# Patient Record
Sex: FEMALE | Race: WHITE | NOT HISPANIC OR LATINO | Employment: PART TIME | URBAN - METROPOLITAN AREA
[De-identification: names, ages, dates, MRNs, and addresses within clinical notes are randomized per-mention and may not be internally consistent; named-entity substitution may affect disease eponyms.]

---
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
## 2022-02-10 ENCOUNTER — APPOINTMENT (OUTPATIENT)
Dept: LAB | Facility: HOSPITAL | Age: 22
End: 2022-02-10
Payer: COMMERCIAL

## 2022-02-10 ENCOUNTER — OFFICE VISIT (OUTPATIENT)
Dept: LAB | Facility: HOSPITAL | Age: 22
End: 2022-02-10
Payer: COMMERCIAL

## 2022-02-10 DIAGNOSIS — U09.9 POST-COVID-19 CONDITION: ICD-10-CM

## 2022-02-10 LAB
ATRIAL RATE: 59 BPM
ATRIAL RATE: 62 BPM
BASOPHILS # BLD AUTO: 0.05 THOUSANDS/ΜL (ref 0–0.1)
BASOPHILS NFR BLD AUTO: 1 % (ref 0–1)
CARDIAC TROPONIN I PNL SERPL HS: <2 NG/L (ref 8–18)
CHOLEST SERPL-MCNC: 143 MG/DL
EOSINOPHIL # BLD AUTO: 0.11 THOUSAND/ΜL (ref 0–0.61)
EOSINOPHIL NFR BLD AUTO: 2 % (ref 0–6)
ERYTHROCYTE [DISTWIDTH] IN BLOOD BY AUTOMATED COUNT: 12 % (ref 11.6–15.1)
GLUCOSE P FAST SERPL-MCNC: 95 MG/DL (ref 65–99)
HCT VFR BLD AUTO: 39.3 % (ref 34.8–46.1)
HDLC SERPL-MCNC: 43 MG/DL
HGB BLD-MCNC: 12.9 G/DL (ref 11.5–15.4)
IMM GRANULOCYTES # BLD AUTO: 0.01 THOUSAND/UL (ref 0–0.2)
IMM GRANULOCYTES NFR BLD AUTO: 0 % (ref 0–2)
LDLC SERPL CALC-MCNC: 92 MG/DL (ref 0–100)
LYMPHOCYTES # BLD AUTO: 1.57 THOUSANDS/ΜL (ref 0.6–4.47)
LYMPHOCYTES NFR BLD AUTO: 26 % (ref 14–44)
MCH RBC QN AUTO: 29.9 PG (ref 26.8–34.3)
MCHC RBC AUTO-ENTMCNC: 32.8 G/DL (ref 31.4–37.4)
MCV RBC AUTO: 91 FL (ref 82–98)
MONOCYTES # BLD AUTO: 0.37 THOUSAND/ΜL (ref 0.17–1.22)
MONOCYTES NFR BLD AUTO: 6 % (ref 4–12)
NEUTROPHILS # BLD AUTO: 3.87 THOUSANDS/ΜL (ref 1.85–7.62)
NEUTS SEG NFR BLD AUTO: 65 % (ref 43–75)
NONHDLC SERPL-MCNC: 100 MG/DL
NRBC BLD AUTO-RTO: 0 /100 WBCS
P AXIS: 14 DEGREES
P AXIS: 42 DEGREES
PLATELET # BLD AUTO: 389 THOUSANDS/UL (ref 149–390)
PMV BLD AUTO: 9.8 FL (ref 8.9–12.7)
PR INTERVAL: 154 MS
PR INTERVAL: 154 MS
QRS AXIS: 29 DEGREES
QRS AXIS: 30 DEGREES
QRSD INTERVAL: 82 MS
QRSD INTERVAL: 82 MS
QT INTERVAL: 416 MS
QT INTERVAL: 420 MS
QTC INTERVAL: 415 MS
QTC INTERVAL: 422 MS
RBC # BLD AUTO: 4.31 MILLION/UL (ref 3.81–5.12)
T WAVE AXIS: 20 DEGREES
T WAVE AXIS: 20 DEGREES
TRIGL SERPL-MCNC: 40 MG/DL
TSH SERPL DL<=0.05 MIU/L-ACNC: 1 UIU/ML (ref 0.46–3.98)
VENTRICULAR RATE: 59 BPM
VENTRICULAR RATE: 62 BPM
WBC # BLD AUTO: 5.98 THOUSAND/UL (ref 4.31–10.16)

## 2022-02-10 PROCEDURE — 93010 ELECTROCARDIOGRAM REPORT: CPT | Performed by: INTERNAL MEDICINE

## 2022-02-10 PROCEDURE — 93005 ELECTROCARDIOGRAM TRACING: CPT

## 2022-02-10 PROCEDURE — 82947 ASSAY GLUCOSE BLOOD QUANT: CPT

## 2022-02-10 PROCEDURE — 85025 COMPLETE CBC W/AUTO DIFF WBC: CPT

## 2022-02-10 PROCEDURE — 84484 ASSAY OF TROPONIN QUANT: CPT

## 2022-02-10 PROCEDURE — 84443 ASSAY THYROID STIM HORMONE: CPT

## 2022-02-10 PROCEDURE — 80061 LIPID PANEL: CPT

## 2022-02-10 PROCEDURE — 36415 COLL VENOUS BLD VENIPUNCTURE: CPT

## 2022-02-15 ENCOUNTER — PROCEDURE VISIT (OUTPATIENT)
Dept: OBGYN CLINIC | Facility: CLINIC | Age: 22
End: 2022-02-15
Payer: COMMERCIAL

## 2022-02-15 VITALS
WEIGHT: 160 LBS | BODY MASS INDEX: 25.11 KG/M2 | DIASTOLIC BLOOD PRESSURE: 70 MMHG | SYSTOLIC BLOOD PRESSURE: 122 MMHG | HEIGHT: 67 IN

## 2022-02-15 DIAGNOSIS — Z97.5 CONTRACEPTION, DEVICE INTRAUTERINE: Primary | ICD-10-CM

## 2022-02-15 PROCEDURE — 58300 INSERT INTRAUTERINE DEVICE: CPT

## 2022-02-15 NOTE — PATIENT INSTRUCTIONS
Levonorgestrel (Into the uterus)   Levonorgestrel (dow-[id number]-[id number]-IVAN-trel)  Prevents pregnancy and treats heavy menstrual bleeding  This is an intrauterine device (IUD), which is a reversible form of birth control  This IUD slowly releases levonorgestrel, a hormone  Brand Name(s): Sherle Arm, Mirena, [place]   There may be other brand names for this medicine  When This Medicine Should Not Be Used: This device is not right for everyone  Do not use it if you had an allergic reaction to levonorgestrel, silicone, polyethylene, silica, barium sulfate or iron oxide, or if you are pregnant  How to Use This Medicine:   Device  · A nurse or other trained health professional will give you this medicine  · The IUD is usually inserted by your doctor during your monthly period  You will need to see your doctor 4 to 6 weeks after the IUD is placed and then once a year  · Your IUD has a string or "tail" that is made of plastic thread  About one or two inches of this string hangs into your vagina  You cannot see this string, and it will not cause problems when you have sex  Check your IUD after each monthly period  You may not be protected against pregnancy if you cannot feel the string or if you feel plastic  Do the following to check the placement of your IUD:  ? Wash your hands with soap and warm water  Dry them with a clean towel  ? Bend your knees and squat low to the ground  ? Gently put your index finger high inside your vagina  The cervix is at the top of the vagina  Find the IUD string coming from your cervix  Never pull on the string  You should not be able to feel the plastic of the IUD itself  Wash your hands after you are done checking your IUD string  · Your doctor will need to remove your IUD after 3 years for [facility], after 5 years for [facility], after 6 years for EDVIN, or after 7 years for Mirena®  You will also need to have it replaced if it comes out of your uterus   If you are using Mirena® or Liletta® and want to stop, your doctor can remove it at any time  However, you may become pregnant as soon as Jayne Valentino, Mirena®, or Jami Counter is removed or if you have intercourse the week before Babs Hungerford is removed  Use another form of birth control or have a new IUD inserted to keep from getting pregnant  Drugs and Foods to Avoid:   Ask your doctor or pharmacist before using any other medicine, including over-the-counter medicines, vitamins, and herbal products  · Some medicines can affect how this device works  Tell your doctor if you are using a blood thinner (including warfarin)  Warnings While Using This Medicine:   · Tell your doctor if you have had any problems, infections, or other conditions that affected your reproductive system  There are many problems that could make an IUD a bad choice for you, including if you have fibroids, unexplained bleeding, a uterus that has an unusual shape, a recent infection, a history of pelvic inflammatory disease, an abnormal Pap test, ectopic pregnancy, cancer or suspected cancer, or an existing IUD  · Tell your doctor if you are breastfeeding, or if you had a baby, miscarriage, or  in the past 3 months  Tell your doctor if you have liver disease (including tumor or cancer), heart disease, breast cancer, heart or blood circulation problems, migraine, high blood pressure, or a history of heart valve problems, blood clotting problems, stroke, or heart attack  Tell your doctor if you have problems with your immune system or have had surgery on your female organs (especially fallopian tubes)  · There is a small chance that you could get pregnant when using an IUD, just as there is with any birth control  If you get pregnant, your doctor may remove your IUD to lower the risk of miscarriage or other problems  · This medicine may cause the following problems:  ? Increased risk of ectopic pregnancy (pregnancy outside the uterus)  ?  Increased risk of serious infections, including sepsis  ? Increased risk of pelvic inflammatory disease (PID) or endometritis  ? Perforation (hole in the wall of your uterus), which can damage other organs  ? Increased risk for ovarian cysts  ? Increased risk of cancer of the breast, uterus, or cervix  ? Increased risk of high blood pressure, stroke, heart attack, or clotting problems  ? Jaundice (yellow skin or eyes)  · You might have some spotting and cramping during the first weeks after the IUD has been inserted  These symptoms should decrease or go away within a few weeks up to 6 months  · You could have less bleeding or even stop having periods by the end of the first year  Call your doctor if you have a change from your regular bleeding pattern after you have had your IUD for awhile, including more bleeding or if you miss a period (and you were having periods even with your IUD)  · An IUD can slip partly or all the way out of your uterus  If this happens, use condoms or another form of birth control, and call your doctor right away  · This IUD will not protect you from HIV/AIDS or other sexually transmitted diseases  · If you have the [address] [place] [address] or [facility], tell your doctor before you have an MRI test   · Your doctor will check your progress and the effects of this medicine at regular visits  Keep all appointments    Possible Side Effects While Using This Medicine:   Call your doctor right away if you notice any of these side effects:  · Allergic reaction: Itching or hives, swelling in your face or hands, swelling or tingling in your mouth or throat, chest tightness, trouble breathing  · Bloating, stomach or pelvic pain, spasm, tenderness, or cramping that is sudden or severe  · Chest pain, problems with speech or walking, numbness or weakness in your arm or leg or on one side of your body  · Heavy bleeding from your vagina  · Pain during sex, or if your partner feels the hard plastic of the IUD during sex  · Severe headache, vision changes  · Unusual bleeding, bruising, or weakness  · Vaginal discharge that has a bad smell, fever, chills, sores on your genitals  · Yellow skin or eyes  If you notice these less serious side effects, talk with your doctor:   · Acne, dandruff, oily skin or other skin changes  · Breast pain or discomfort  · Change in bleeding pattern after the first few months  · Dizziness or lightheadedness after IUD is placed  · Mild itching around your vagina and genitals  · Weight gain  If you notice other side effects that you think are caused by this medicine, tell your doctor  Call your doctor for medical advice about side effects  You may report side effects to FDA at 2-520-FDA-9428    © Copyright [employer] 2021 Information is for End User's use only and may not be sold, redistributed or otherwise used for commercial purposes  The above information is an  only  It is not intended as medical advice for individual conditions or treatments  Talk to your doctor, nurse or pharmacist before following any medical regimen to see if it is safe and effective for you

## 2022-02-15 NOTE — PROGRESS NOTES
IUD Insertion Procedure Note    Pre-operative Diagnosis: request for IUD    Post-operative Diagnosis: same    Indications: contraception    Procedure Details   Urine pregnancy test was done today  and result was negative     The risks (including infection, bleeding, pain, and uterine perforation) and benefits of the procedure were explained to the patient and Written informed consent was obtained  Cervix cleansed with Betadine  Uterus sounded to 6 cm  IUD inserted without difficulty  String visible and trimmed  Patient tolerated procedure well  IUD Information:  Mirena  Condition:  Stable    Complications:  None    Plan:    The patient was advised to call for any fever or for prolonged or severe pain or bleeding  She was advised to use OTC ibuprofen as needed for mild to moderate pain  Attending Physician Documentation:  I was present for or participated in the entire procedure, including opening and closing

## 2022-03-01 ENCOUNTER — OFFICE VISIT (OUTPATIENT)
Dept: OBGYN CLINIC | Facility: CLINIC | Age: 22
End: 2022-03-01
Payer: COMMERCIAL

## 2022-03-01 VITALS
DIASTOLIC BLOOD PRESSURE: 80 MMHG | SYSTOLIC BLOOD PRESSURE: 122 MMHG | WEIGHT: 160 LBS | BODY MASS INDEX: 25.11 KG/M2 | HEIGHT: 67 IN

## 2022-03-01 DIAGNOSIS — Z97.5 CONTRACEPTION, DEVICE INTRAUTERINE: Primary | ICD-10-CM

## 2022-03-01 PROCEDURE — 99213 OFFICE O/P EST LOW 20 MIN: CPT | Performed by: OBSTETRICS & GYNECOLOGY

## 2022-03-01 NOTE — PROGRESS NOTES
Assessment/Plan:         Diagnoses and all orders for this visit:    Contraception, device intrauterine          Subjective:      Patient ID: Ovidio Mac is a 25 y o  female  Patient is a 24-year-old nulligravida who requests an IUD for control of irregular menstrual bleeding and for contraceptive who had an IUD placed 2 weeks ago  She is having no problems with it, denies chills, fever, nausea, vomiting or unusual bleeding  Examination today the strings are visible, she does not have any cervical motion tenderness  The remainder pelvic exam is within normal limits without adnexa tenderness or evidence of infection  We will see her back in 1 year or as needed  The following portions of the patient's history were reviewed and updated as appropriate: allergies, current medications, past family history, past medical history, past social history, past surgical history and problem list     Review of Systems   Constitutional: Negative for chills, diaphoresis, fatigue, fever and unexpected weight change  HENT: Negative for congestion, sinus pressure, sinus pain, tinnitus and trouble swallowing  Eyes: Negative for visual disturbance  Respiratory: Negative for cough, chest tightness and shortness of breath  Cardiovascular: Negative for chest pain, palpitations and leg swelling  Gastrointestinal: Negative for abdominal distention, abdominal pain, anal bleeding, constipation, diarrhea, nausea, rectal pain and vomiting  Endocrine: Negative for heat intolerance  Genitourinary: Negative for difficulty urinating, dysuria, flank pain, frequency, genital sores, hematuria and urgency  Musculoskeletal: Negative for arthralgias, back pain and joint swelling  Skin: Negative for rash  Allergic/Immunologic: Negative for environmental allergies and food allergies  Neurological: Negative for headaches  Hematological: Negative for adenopathy  Does not bruise/bleed easily     Psychiatric/Behavioral: Negative for decreased concentration and dysphoric mood  The patient is not nervous/anxious  Objective:      /80 (BP Location: Left arm)   Ht 5' 6 5" (1 689 m)   Wt 72 6 kg (160 lb)   LMP 02/11/2022   BMI 25 44 kg/m²          Physical Exam  Vitals and nursing note reviewed  Exam conducted with a chaperone present  Constitutional:       Appearance: Normal appearance  She is normal weight  HENT:      Head: Normocephalic and atraumatic  Nose: Nose normal    Eyes:      Conjunctiva/sclera: Conjunctivae normal    Pulmonary:      Effort: Pulmonary effort is normal    Abdominal:      General: Abdomen is flat  Palpations: Abdomen is soft  Genitourinary:     General: Normal vulva  Exam position: Lithotomy position  Vagina: Normal       Cervix: No cervical motion tenderness  Uterus: Normal  Not deviated, not enlarged and not tender  Adnexa: Right adnexa normal and left adnexa normal          Musculoskeletal:         General: Normal range of motion  Skin:     General: Skin is warm and dry  Neurological:      General: No focal deficit present  Mental Status: She is alert  Mental status is at baseline  Psychiatric:         Mood and Affect: Mood normal          Behavior: Behavior normal          Thought Content:  Thought content normal          Judgment: Judgment normal

## 2022-03-16 ENCOUNTER — OFFICE VISIT (OUTPATIENT)
Dept: OBGYN CLINIC | Facility: CLINIC | Age: 22
End: 2022-03-16
Payer: COMMERCIAL

## 2022-03-16 VITALS
BODY MASS INDEX: 25.11 KG/M2 | DIASTOLIC BLOOD PRESSURE: 70 MMHG | SYSTOLIC BLOOD PRESSURE: 112 MMHG | HEIGHT: 67 IN | WEIGHT: 160 LBS

## 2022-03-16 DIAGNOSIS — Z30.431 IUD CHECK UP: Primary | ICD-10-CM

## 2022-03-16 PROCEDURE — 99213 OFFICE O/P EST LOW 20 MIN: CPT | Performed by: OBSTETRICS & GYNECOLOGY

## 2022-03-16 NOTE — PROGRESS NOTES
Assessment/Plan:         There are no diagnoses linked to this encounter  Subjective:      Patient ID: Gage Alcala is a 25 y o  female  Patient is a 24-year-old nulligravida who presents 2 weeks following insertion of Mirena IUD  She developed heavy vaginal bleeding and pain and cramping  She thought it was similar to a normal menstrual period but reached into her vagina to feel her strings and could feel a portion of the plastic tail of the IUD  Because she could palpate the plastic she pulled on the strings of the IUD came out  We discussed options risks and benefits  Spontaneous expulsion during the 1st menstrual   Following insertion is uncommon but can happen  She would prefer an IUD rather than any other form of birth control  Will order another IUD for her and try 1 more time  The following portions of the patient's history were reviewed and updated as appropriate: allergies, current medications, past family history, past medical history, past social history, past surgical history and problem list     Review of Systems   Constitutional: Negative for chills, diaphoresis, fatigue, fever and unexpected weight change  HENT: Negative for congestion, sinus pressure, sinus pain, tinnitus and trouble swallowing  Eyes: Negative for visual disturbance  Respiratory: Negative for cough, chest tightness and shortness of breath  Cardiovascular: Negative for chest pain, palpitations and leg swelling  Gastrointestinal: Negative for abdominal distention, abdominal pain, anal bleeding, constipation, diarrhea, nausea, rectal pain and vomiting  Endocrine: Negative for heat intolerance  Genitourinary: Negative for difficulty urinating, dysuria, flank pain, frequency, genital sores, hematuria and urgency  Musculoskeletal: Negative for arthralgias, back pain and joint swelling  Skin: Negative for rash  Allergic/Immunologic: Negative for environmental allergies and food allergies  Neurological: Negative for headaches  Hematological: Negative for adenopathy  Does not bruise/bleed easily  Psychiatric/Behavioral: Negative for decreased concentration and dysphoric mood  The patient is not nervous/anxious  Objective:      /70 (BP Location: Left arm)   Ht 5' 6 5" (1 689 m)   Wt 72 6 kg (160 lb)   LMP 03/04/2022   BMI 25 44 kg/m²          Physical Exam  Vitals and nursing note reviewed  Exam conducted with a chaperone present  Constitutional:       Appearance: Normal appearance  She is normal weight  HENT:      Head: Normocephalic and atraumatic  Nose: Nose normal    Eyes:      Conjunctiva/sclera: Conjunctivae normal    Pulmonary:      Effort: Pulmonary effort is normal    Abdominal:      General: Abdomen is flat  Palpations: Abdomen is soft  Musculoskeletal:         General: Normal range of motion  Skin:     General: Skin is warm and dry  Neurological:      General: No focal deficit present  Mental Status: She is alert  Mental status is at baseline  Psychiatric:         Mood and Affect: Mood normal          Behavior: Behavior normal          Thought Content:  Thought content normal          Judgment: Judgment normal

## 2022-03-29 ENCOUNTER — OFFICE VISIT (OUTPATIENT)
Dept: URGENT CARE | Facility: CLINIC | Age: 22
End: 2022-03-29
Payer: COMMERCIAL

## 2022-03-29 VITALS
HEART RATE: 56 BPM | OXYGEN SATURATION: 99 % | BODY MASS INDEX: 25.11 KG/M2 | TEMPERATURE: 97.5 F | RESPIRATION RATE: 18 BRPM | HEIGHT: 67 IN | WEIGHT: 160 LBS

## 2022-03-29 DIAGNOSIS — M79.602 LEFT ARM PAIN: Primary | ICD-10-CM

## 2022-03-29 PROCEDURE — 99213 OFFICE O/P EST LOW 20 MIN: CPT | Performed by: PHYSICIAN ASSISTANT

## 2022-03-29 RX ORDER — CYCLOBENZAPRINE HCL 10 MG
10 TABLET ORAL
Qty: 10 TABLET | Refills: 0 | Status: SHIPPED | OUTPATIENT
Start: 2022-03-29 | End: 2022-04-08

## 2022-03-29 RX ORDER — PREDNISONE 10 MG/1
40 TABLET ORAL DAILY
Qty: 16 TABLET | Refills: 0 | Status: SHIPPED | OUTPATIENT
Start: 2022-03-29 | End: 2022-04-02

## 2022-03-29 NOTE — PROGRESS NOTES
[facility]        NAME: Buzz Van is a 25 y o  female  : 2000    MRN: [id number]  DATE: 2022  TIME: 12:52 PM    Assessment and Plan   Left arm pain [M79 602]  1  Left arm pain  cyclobenzaprine (FLEXERIL) 10 mg tablet    predniSONE 10 mg tablet         Patient Instructions   There are no Patient Instructions on file for this visit  Follow up with PCP in 3-5 days  Proceed to  ER if symptoms worsen  Chief Complaint     Chief Complaint   Patient presents with    Arm Pain     L arm pain that is worse at night when lying on L side  x 3-4 days  History of Present Illness       The pt is a 68-year-old female presenting today for left arm pain  The pain is worse at night when laying down  The pain has been going on for about 3-4 days  No long car rides, OCP's or smoking hx  No CP or SOB  The pain is a sharp shooting pain up the ribs and down the left arm  Review of Systems   Review of Systems   Constitutional: Negative for activity change, appetite change, chills, fatigue and fever  HENT: Negative for congestion, rhinorrhea, sinus pressure, sinus pain and sore throat  Respiratory: Negative for cough, chest tightness and shortness of breath  Cardiovascular: Negative for chest pain and palpitations  Gastrointestinal: Negative for diarrhea, nausea and vomiting  Musculoskeletal: Positive for arthralgias and myalgias  Skin: Negative for color change, pallor, rash and wound  Neurological: Negative for headaches           Current Medications       Current Outpatient Medications:     SUMAtriptan (IMITREX) 25 mg tablet, as needed  , Disp: , Rfl:     cyclobenzaprine (FLEXERIL) 10 mg tablet, Take 1 tablet (10 mg total) by mouth daily at bedtime for 10 days, Disp: 10 tablet, Rfl: 0    predniSONE 10 mg tablet, Take 5 tabs day 1, 2; take 4 tabs day 3 ,4; take 3 tabs day 5, 6; take 2 tabs day 8, 9; take 1 tab day 10, 11 , Disp: 26 tablet, Rfl: 0    predniSONE 10 mg tablet, Take 4 tablets (40 mg total) by mouth daily for 4 days, Disp: 16 tablet, Rfl: 0    predniSONE 20 mg tablet, Take 20 mg by mouth  1 tablet daily for three days then 1 tablet daily for two days, Disp: , Rfl:     Current Allergies     Allergies as of 03/29/2022    (No Known Allergies)            The following portions of the patient's history were reviewed and updated as appropriate: allergies, current medications, past family history, past medical history, past social history, past surgical history and problem list      Past Medical History:   Diagnosis Date    Asthma     Migraines        History reviewed  No pertinent surgical history  History reviewed  No pertinent family history  Medications have been verified  Objective   Pulse 56   Temp 97 5 °F (36 4 °C) (Temporal)   Resp 18   Ht 5' 6 5" (1 689 m)   Wt 72 6 kg (160 lb)   LMP 03/04/2022   SpO2 99%   BMI 25 44 kg/m²        Physical Exam     Physical Exam  Vitals and nursing note reviewed  Constitutional:       General: She is not in acute distress  Appearance: Normal appearance  She is normal weight  She is not ill-appearing, toxic-appearing or diaphoretic  HENT:      Head: Normocephalic and atraumatic  Cardiovascular:      Rate and Rhythm: Normal rate and regular rhythm  Heart sounds: Normal heart sounds  No murmur heard  No friction rub  No gallop  Pulmonary:      Effort: Pulmonary effort is normal  No respiratory distress  Breath sounds: Normal breath sounds  No stridor  No wheezing, rhonchi or rales  Chest:      Chest wall: No tenderness  Musculoskeletal:         General: Tenderness (left rib) present  No swelling  Normal range of motion  Skin:     General: Skin is warm and dry  Capillary Refill: Capillary refill takes less than 2 seconds  Neurological:      Mental Status: She is alert

## 2022-05-24 ENCOUNTER — OFFICE VISIT (OUTPATIENT)
Dept: OBGYN CLINIC | Facility: CLINIC | Age: 22
End: 2022-05-24
Payer: COMMERCIAL

## 2022-05-24 VITALS
DIASTOLIC BLOOD PRESSURE: 72 MMHG | BODY MASS INDEX: 34.39 KG/M2 | SYSTOLIC BLOOD PRESSURE: 118 MMHG | WEIGHT: 214 LBS | HEIGHT: 66 IN

## 2022-05-24 DIAGNOSIS — N91.2 AMENORRHEA: Primary | ICD-10-CM

## 2022-05-24 DIAGNOSIS — Z97.5 CONTRACEPTION, DEVICE INTRAUTERINE: ICD-10-CM

## 2022-05-24 LAB — SL AMB POCT URINE HCG: NORMAL

## 2022-05-24 PROCEDURE — 99214 OFFICE O/P EST MOD 30 MIN: CPT | Performed by: OBSTETRICS & GYNECOLOGY

## 2022-05-24 PROCEDURE — 81025 URINE PREGNANCY TEST: CPT | Performed by: OBSTETRICS & GYNECOLOGY

## 2022-05-24 NOTE — PROGRESS NOTES
IUD Insertion Procedure Note    Pre-operative Diagnosis: request for IUD    Post-operative Diagnosis: same    Indications: contraception    Procedure Details   Urine pregnancy test was not done  The risks (including infection, bleeding, pain, and uterine perforation) and benefits of the procedure were explained to the patient and Written informed consent was obtained  Cervix cleansed with Betadine  Uterus sounded to 8 cm  IUD inserted without difficulty  String visible and trimmed  Patient tolerated procedure well  IUD Information:  Mirena  Condition:  Stable    Complications:  None    Plan:    The patient was advised to call for any fever or for prolonged or severe pain or bleeding  She was advised to use NSAID and OTC ibuprofen as needed for mild to moderate pain  Attending Physician Documentation:  I was present for or participated in the entire procedure, including opening and closing

## 2022-06-08 ENCOUNTER — OFFICE VISIT (OUTPATIENT)
Dept: OBGYN CLINIC | Facility: CLINIC | Age: 22
End: 2022-06-08
Payer: COMMERCIAL

## 2022-06-08 VITALS
SYSTOLIC BLOOD PRESSURE: 122 MMHG | HEIGHT: 63 IN | WEIGHT: 212 LBS | DIASTOLIC BLOOD PRESSURE: 76 MMHG | BODY MASS INDEX: 37.56 KG/M2

## 2022-06-08 DIAGNOSIS — Z30.431 IUD CHECK UP: Primary | ICD-10-CM

## 2022-06-08 PROCEDURE — 99213 OFFICE O/P EST LOW 20 MIN: CPT | Performed by: OBSTETRICS & GYNECOLOGY

## 2022-06-08 RX ORDER — ACETAMINOPHEN AND CODEINE PHOSPHATE 300; 30 MG/1; MG/1
TABLET ORAL
COMMUNITY
Start: 2022-06-02

## 2022-06-08 RX ORDER — AMOXICILLIN 500 MG/1
CAPSULE ORAL
COMMUNITY
Start: 2022-06-02

## 2022-06-08 NOTE — PROGRESS NOTES
Assessment/Plan:         Diagnoses and all orders for this visit:    IUD check up    Other orders  -     amoxicillin (AMOXIL) 500 mg capsule  -     acetaminophen-codeine (TYLENOL with CODEINE #3) 300-30 MG per tablet;  (Patient not taking: Reported on 6/8/2022)          Subjective:      Patient ID: Kam Ayala is a 25 y o  female  HPI    The following portions of the patient's history were reviewed and updated as appropriate: allergies, current medications, past family history, past medical history, past social history, past surgical history and problem list     Review of Systems   Constitutional: Negative for chills, diaphoresis, fatigue, fever and unexpected weight change  HENT: Negative for congestion, sinus pressure, sinus pain, tinnitus and trouble swallowing  Eyes: Negative for visual disturbance  Respiratory: Negative for cough, chest tightness and shortness of breath  Cardiovascular: Negative for chest pain, palpitations and leg swelling  Gastrointestinal: Negative for abdominal distention, abdominal pain, anal bleeding, constipation, diarrhea, nausea, rectal pain and vomiting  Endocrine: Negative for heat intolerance  Genitourinary: Negative for difficulty urinating, dysuria, flank pain, frequency, genital sores, hematuria and urgency  Musculoskeletal: Negative for arthralgias, back pain and joint swelling  Skin: Negative for rash  Allergic/Immunologic: Negative for environmental allergies and food allergies  Neurological: Negative for headaches  Hematological: Negative for adenopathy  Does not bruise/bleed easily  Psychiatric/Behavioral: Negative for decreased concentration and dysphoric mood  The patient is not nervous/anxious  Objective:      /76 (BP Location: Left arm)   Ht 5' 3" (1 6 m)   Wt 96 2 kg (212 lb)   LMP 05/19/2022 (Approximate)   BMI 37 55 kg/m²          Physical Exam  Vitals and nursing note reviewed  Exam conducted with a chaperone present  Constitutional:       Appearance: Normal appearance  She is normal weight  HENT:      Head: Normocephalic and atraumatic  Nose: Nose normal    Eyes:      Conjunctiva/sclera: Conjunctivae normal    Pulmonary:      Effort: Pulmonary effort is normal    Abdominal:      General: Abdomen is flat  Palpations: Abdomen is soft  Genitourinary:     General: Normal vulva  Exam position: Lithotomy position  Vagina: Normal       Cervix: Normal       Uterus: Normal        Adnexa: Right adnexa normal and left adnexa normal       Comments: Strings visible  Musculoskeletal:         General: Normal range of motion  Skin:     General: Skin is warm and dry  Neurological:      General: No focal deficit present  Mental Status: She is alert  Mental status is at baseline  Psychiatric:         Mood and Affect: Mood normal          Behavior: Behavior normal          Thought Content:  Thought content normal          Judgment: Judgment normal

## 2022-06-17 ENCOUNTER — OFFICE VISIT (OUTPATIENT)
Dept: URGENT CARE | Facility: CLINIC | Age: 22
End: 2022-06-17
Payer: COMMERCIAL

## 2022-06-17 ENCOUNTER — APPOINTMENT (EMERGENCY)
Dept: RADIOLOGY | Facility: HOSPITAL | Age: 22
End: 2022-06-17
Payer: COMMERCIAL

## 2022-06-17 ENCOUNTER — HOSPITAL ENCOUNTER (EMERGENCY)
Facility: HOSPITAL | Age: 22
Discharge: HOME | End: 2022-06-17
Attending: EMERGENCY MEDICINE | Admitting: EMERGENCY MEDICINE
Payer: COMMERCIAL

## 2022-06-17 VITALS
OXYGEN SATURATION: 98 % | DIASTOLIC BLOOD PRESSURE: 77 MMHG | SYSTOLIC BLOOD PRESSURE: 140 MMHG | TEMPERATURE: 98 F | RESPIRATION RATE: 18 BRPM | HEART RATE: 70 BPM

## 2022-06-17 VITALS
SYSTOLIC BLOOD PRESSURE: 128 MMHG | DIASTOLIC BLOOD PRESSURE: 90 MMHG | RESPIRATION RATE: 16 BRPM | TEMPERATURE: 97.4 F | HEART RATE: 58 BPM | OXYGEN SATURATION: 99 %

## 2022-06-17 DIAGNOSIS — R10.32 LLQ ABDOMINAL PAIN: Primary | ICD-10-CM

## 2022-06-17 DIAGNOSIS — R10.32 LEFT LOWER QUADRANT ABDOMINAL PAIN: Primary | ICD-10-CM

## 2022-06-17 DIAGNOSIS — N83.209 OVARIAN CYST: ICD-10-CM

## 2022-06-17 LAB
AMORPH URATE CRY URNS QL MICRO: NORMAL
BACTERIA UR QL AUTO: NORMAL /HPF
BILIRUB UR QL STRIP: NEGATIVE
CLARITY UR: CLEAR
COLOR UR: YELLOW
COLOR, POC: YELLOW
EXT PREG TEST URINE: NEGATIVE
EXT. CONTROL ED NAV: NORMAL
GLUCOSE UR STRIP-MCNC: NEGATIVE MG/DL
HGB UR QL STRIP.AUTO: ABNORMAL
KETONES UR STRIP-MCNC: NEGATIVE MG/DL
LEUKOCYTE ESTERASE UR QL STRIP: NEGATIVE
NITRITE UR QL STRIP: NEGATIVE
NON-SQ EPI CELLS URNS QL MICRO: NORMAL /HPF
PH UR STRIP.AUTO: 7.5 [PH] (ref 4.5–8)
PROT UR STRIP-MCNC: NEGATIVE MG/DL
RBC #/AREA URNS AUTO: NORMAL /HPF
SP GR UR STRIP.AUTO: 1.02 (ref 1–1.03)
UROBILINOGEN UR QL STRIP.AUTO: 0.2 E.U./DL
WBC #/AREA URNS AUTO: NORMAL /HPF

## 2022-06-17 PROCEDURE — 81001 URINALYSIS AUTO W/SCOPE: CPT

## 2022-06-17 PROCEDURE — S9083 URGENT CARE CENTER GLOBAL: HCPCS

## 2022-06-17 PROCEDURE — 74176 CT ABD & PELVIS W/O CONTRAST: CPT

## 2022-06-17 PROCEDURE — G0382 LEV 3 HOSP TYPE B ED VISIT: HCPCS

## 2022-06-17 PROCEDURE — 76856 US EXAM PELVIC COMPLETE: CPT

## 2022-06-17 PROCEDURE — 81025 URINE PREGNANCY TEST: CPT

## 2022-06-17 PROCEDURE — 76830 TRANSVAGINAL US NON-OB: CPT

## 2022-06-17 PROCEDURE — 99284 EMERGENCY DEPT VISIT MOD MDM: CPT

## 2022-06-17 PROCEDURE — G1004 CDSM NDSC: HCPCS

## 2022-06-17 PROCEDURE — 99284 EMERGENCY DEPT VISIT MOD MDM: CPT | Performed by: EMERGENCY MEDICINE

## 2022-06-17 NOTE — DISCHARGE INSTRUCTIONS
Your workup here was not concerning for anything dangerous  Therefore there is no need for you to stay at the hospital for further testing  We feel safe to send you home  You can use Tylenol and Motrin for management of your symptoms  Your imaging findings had the following:   Approximate 6 cm left ovarian unilocular cyst  Follow-up with repeat ultrasound in 8-12 weeks  You should follow up with your OBGYN to assess for resolution of your symptoms and to determine if there is further evaluation that needs to be performed      Return to the emergency department if you have any symptoms of worsening pain or nausea or vomiting

## 2022-06-17 NOTE — ED PROVIDER NOTES
Final Diagnosis:  No diagnosis found  Chief Complaint   Patient presents with    Abdominal Pain     Pt presents ambulatory with c/o LLQ pain  Sent from urgent care        History of Present Illness   This is a 25 y o  female PMH significant for asthma, recent wisdom tooth extraction coming in today with complaint of left lower quadrant abdominal pain  She reports this started 2 days ago  It has been slowly worsening throughout the last 2 days  Reports associated with movement  She does not notice it when lying still  She was evaluated earlier at an urgent care who thought it was related to a recent IUD placement  The patient reports minimal vaginal spotting when she goes to the bathroom, however denies any other bleeding or discharge  She does not feel like she is late on her period  Reports she has not been sexually active  Reports she has been feeling a little nauseous from the amoxicillin, however denies any episodes of vomiting  She has no history kidney stones or abdominal surgeries  No episodes of syncope  Denies other fevers, chills, chest pain, shortness of breath  She does not drink or smoke  She is sexually active, last 1 month ago  No other complaints at this time     - No language barrier    - History obtained from patient and chart   - Reviewed and documented relevant past medical/family/social history  - There are no limitations to the history obtained  - Previous charting was reviewed  Some data reviewed included below for ease of access whether or not it is relevant to this patient encounter  Procedures             Past Medical, Past Surgical History:    has a past medical history of Asthma and Migraines  has a past surgical history that includes Dora tooth extraction       Allergies:   No Known Allergies    Social and Family History:     Social History     Substance and Sexual Activity   Alcohol Use Never     Social History     Tobacco Use   Smoking Status Never Smoker Smokeless Tobacco Never Used     Social History     Substance and Sexual Activity   Drug Use Never       Review of Systems:   Review of Systems   Constitutional: Negative for chills and fever  HENT: Negative for sore throat  Eyes: Negative for visual disturbance  Respiratory: Negative for cough and shortness of breath  Cardiovascular: Negative for chest pain and palpitations  Gastrointestinal: Positive for abdominal pain and nausea  Negative for vomiting  Genitourinary: Negative for dysuria  Musculoskeletal: Negative for back pain  Skin: Negative for rash  Neurological: Negative for syncope  All other systems reviewed and are negative  Physical Examination     Vitals:    06/17/22 1623   BP: 128/90   Pulse: 58   Resp: 16   Temp: (!) 97 4 °F (36 3 °C)   TempSrc: Temporal   SpO2: 99%     Vitals reviewed by me  Physical Exam  Vitals and nursing note reviewed  Constitutional:       General: She is not in acute distress  Appearance: She is well-developed  She is not diaphoretic  HENT:      Head: Normocephalic and atraumatic  Eyes:      Conjunctiva/sclera: Conjunctivae normal    Cardiovascular:      Rate and Rhythm: Normal rate and regular rhythm  Heart sounds: No murmur heard  Pulmonary:      Effort: Pulmonary effort is normal  No respiratory distress  Breath sounds: Normal breath sounds  Abdominal:      Palpations: Abdomen is soft  Tenderness: There is abdominal tenderness in the suprapubic area  There is no right CVA tenderness, left CVA tenderness, guarding or rebound  Musculoskeletal:      Cervical back: Neck supple  Skin:     General: Skin is warm and dry  Neurological:      General: No focal deficit present  Mental Status: She is alert     Psychiatric:         Mood and Affect: Mood normal             Risk Stratification Tools                ED Course as of 06/17/22 1918 Fri Jun 17, 2022   1727 PREGNANCY TEST URINE: negative   1727 Nitrite, UA: Negative   1727 Leukocytes, UA: Negative   1855 US pelvis complete w transvaginal     No orders to display     Orders Placed This Encounter   Procedures    POCT pregnancy, urine    POCT urinalysis dipstick       Labs:   Labs Reviewed - No data to display    Imaging:   No results found  Final Diagnosis:  No diagnosis found  MDM:   Shira Iraheta is a 25 y o  who presents with complaints of abdominal pain    Vital signs are within normal limits, physical exam shows suprapubic pain    Assessment and plan: Differential includes ectopic pregnancy vs IUP vs UTI vs torsion vs other etiology  Will obtain UA and trasnvaginal US  Minimal concern for GI etiology  Will monitor closely  Pt denying GC testing at this time  Pt also denying pain control  Management: UA, UPT, US    ED Course as of 06/17/22 1918 Fri Jun 17, 2022   1727 PREGNANCY TEST URINE: negative   1727 Nitrite, UA: Negative   1727 Leukocytes, UA: Negative   1855 US pelvis complete w transvaginal       Re-assessment: Discussed with pt and participated in shared decision making regarding further imaging as workup negative thus far  Proceeded with CT Abd Pelvis  Also advised on US findings as per above  Re-assessment 2: CT imaging showed cyst consistent with US  Symptoms likely related to this  Advised on close follow up  Pt expressed understanding and agreed to follow up with her OBGYN  Final Dispo   Discharge    Medications - No data to display  ED Disposition     None      Follow-up Information    None       Patient's Medications   Discharge Prescriptions    No medications on file     No discharge procedures on file  Prior to Admission Medications   Prescriptions Last Dose Informant Patient Reported? Taking?    SUMAtriptan (IMITREX) 25 mg tablet   Yes No   Sig: as needed     acetaminophen-codeine (TYLENOL with CODEINE #3) 300-30 MG per tablet   Yes No   Patient not taking: Reported on 6/8/2022   amoxicillin (AMOXIL) 500 mg capsule   Yes No cyclobenzaprine (FLEXERIL) 10 mg tablet   No No   Sig: Take 1 tablet (10 mg total) by mouth daily at bedtime for 10 days   predniSONE 10 mg tablet   No No   Sig: Take 5 tabs day 1, 2; take 4 tabs day 3 ,4; take 3 tabs day 5, 6; take 2 tabs day 8, 9; take 1 tab day 10, 11    predniSONE 20 mg tablet   Yes No   Sig: Take 20 mg by mouth  1 tablet daily for three days then 1 tablet daily for two days      Facility-Administered Medications: None       Code Status: No Order    I discussed all pertinent results and aspects of the care plan with the patient and/or present family members  Answered all questions and addressed all concerns  They expressed agreement and had no further concerns  Portions of the record may have been created with voice recognition software  Occasional wrong word or "sound a like" substitutions may have occurred due to the inherent limitations of voice recognition software  Read the chart carefully and recognize, using context, where substitutions have occurred  The attending physician physically available and evaluated the above patient alongside myself       Alexis Duffy MD  06/18/22 [id number]

## 2022-06-17 NOTE — PROGRESS NOTES
[email or web address]        NAME: Jerman Billy is a 25 y o  female  : 2000    MRN: [id number]  DATE: 2022  TIME: 3:27 PM      Assessment and Plan     LLQ abdominal pain [R10 32]  1  LLQ abdominal pain  Transfer to other facility     Patient agreeable to proceed to the ER for further evaluation given recent IUD insertion- requesting to go to [facility]  Patient Instructions     Proceed to the ER for further evaluation  Chief Complaint     Chief Complaint   Patient presents with    Abdominal Pain     Pt reports lower left sided abdominal pain that started yesterday, currently on antibiotics for wisdom tooth extraction, some nausea at times, pain worse with sitting or trying to use bathroom          History of Present Illness     Patient is a 72-year-old female who presents with LLQ abd pain that started this morning  Reports nausea, no vomiting  Reports she had an IUD placed 2022  States she has had vaginal spotting  States she had an IUD in the past that fell out  Denies vomiting or diarrhea  Denies constipation, last BM this morning  Reports increased pain to LLQ with urination but denies true burning with urination  Denies DM hx  Denies fever or chills    Denies chance of pregnancy  Patient is taking amoxicillin for recent wisdom tooth extraction  States she is no longer taking tylenol w/ codeine  Review of Systems     Review of Systems   Constitutional: Negative for chills and fever  Gastrointestinal: Positive for abdominal pain and nausea  Negative for constipation, diarrhea and vomiting  Genitourinary: Positive for vaginal bleeding  Negative for dysuria  All other systems reviewed and are negative          Current Medications       Current Outpatient Medications:     acetaminophen-codeine (TYLENOL with CODEINE #3) 300-30 MG per tablet, , Disp: , Rfl:     amoxicillin (AMOXIL) 500 mg capsule, , Disp: , Rfl:     cyclobenzaprine (FLEXERIL) 10 mg tablet, Take 1 tablet (10 mg total) by mouth daily at bedtime for 10 days, Disp: 10 tablet, Rfl: 0    predniSONE 10 mg tablet, Take 5 tabs day 1, 2; take 4 tabs day 3 ,4; take 3 tabs day 5, 6; take 2 tabs day 8, 9; take 1 tab day 10, 11 , Disp: 26 tablet, Rfl: 0    predniSONE 20 mg tablet, Take 20 mg by mouth  1 tablet daily for three days then 1 tablet daily for two days, Disp: , Rfl:     SUMAtriptan (IMITREX) 25 mg tablet, as needed  , Disp: , Rfl:     Current Allergies     Allergies as of 06/17/2022    (No Known Allergies)              The following portions of the patient's history were reviewed and updated as appropriate: allergies, current medications, past family history, past medical history, past social history, past surgical history and problem list      Past Medical History:   Diagnosis Date    Asthma     Migraines        Past Surgical History:   Procedure Laterality Date    WISDOM TOOTH EXTRACTION         No family history on file  Medications have been verified  Objective     /77   Pulse 70   Temp 98 °F (36 7 °C)   Resp 18   LMP 05/19/2022 (Approximate)   SpO2 98%   Patient's last menstrual period was 05/19/2022 (approximate)  Physical Exam     Physical Exam  Vitals and nursing note reviewed  Constitutional:       General: She is awake  She is not in acute distress  Appearance: She is well-developed  She is not ill-appearing, toxic-appearing or diaphoretic  Cardiovascular:      Rate and Rhythm: Normal rate  Pulses: Normal pulses  Heart sounds: Normal heart sounds, S1 normal and S2 normal    Pulmonary:      Effort: Pulmonary effort is normal       Breath sounds: Normal breath sounds and air entry  Abdominal:      General: Abdomen is flat  Bowel sounds are normal       Palpations: Abdomen is soft  Tenderness: There is abdominal tenderness in the left lower quadrant  There is no right CVA tenderness or left CVA tenderness  Skin:     General: Skin is warm        Capillary Refill: Capillary refill takes less than 2 seconds  Neurological:      Mental Status: She is alert  Psychiatric:         Mood and Affect: Mood normal          Behavior: Behavior normal          Thought Content:  Thought content normal          Judgment: Judgment normal

## 2022-06-21 DIAGNOSIS — N83.202 OVARIAN CYST, LEFT: Primary | ICD-10-CM

## 2022-09-21 ENCOUNTER — HOSPITAL ENCOUNTER (OUTPATIENT)
Dept: RADIOLOGY | Facility: MEDICAL CENTER | Age: 22
Discharge: HOME | End: 2022-09-21
Payer: COMMERCIAL

## 2022-09-21 DIAGNOSIS — N83.202 OVARIAN CYST, LEFT: ICD-10-CM

## 2022-09-21 PROCEDURE — 76830 TRANSVAGINAL US NON-OB: CPT

## 2022-09-21 PROCEDURE — 76856 US EXAM PELVIC COMPLETE: CPT

## 2022-11-14 ENCOUNTER — OFFICE VISIT (OUTPATIENT)
Dept: URGENT CARE | Facility: MEDICAL CENTER | Age: 22
End: 2022-11-14

## 2022-11-14 VITALS
RESPIRATION RATE: 18 BRPM | BODY MASS INDEX: 39.4 KG/M2 | WEIGHT: 222.4 LBS | HEART RATE: 74 BPM | TEMPERATURE: 98 F | OXYGEN SATURATION: 100 %

## 2022-11-14 DIAGNOSIS — R05.1 ACUTE COUGH: Primary | ICD-10-CM

## 2022-11-14 DIAGNOSIS — R09.82 POSTNASAL DRIP: ICD-10-CM

## 2022-11-14 RX ORDER — DEXTROMETHORPHAN HYDROBROMIDE AND PROMETHAZINE HYDROCHLORIDE 15; 6.25 MG/5ML; MG/5ML
5 SOLUTION ORAL 4 TIMES DAILY PRN
Qty: 120 ML | Refills: 0 | Status: SHIPPED | OUTPATIENT
Start: 2022-11-14

## 2022-11-14 NOTE — PATIENT INSTRUCTIONS
1  Over-the-counter cetirizine 10 mg daily  2  Operate a vaporizer or humidifier in your sleeping area  3  Promethazine DM as prescribed for cough  4  Return here see her primary care provider in 7-10 days for any persistent symptoms  5  Go to the ER immediately for any significantly worsening symptoms

## 2022-11-14 NOTE — PROGRESS NOTES
[facility]        NAME: Seble Call is a 23 y o  female  : 2000    MRN: [id number]  DATE: 2022  TIME: 2:56 PM    Assessment and Plan   Acute cough [R05 1]  1  Acute cough  Promethazine-DM (PHENERGAN-DM) 6 25-15 mg/5 mL oral syrup   2  Postnasal drip  Promethazine-DM (PHENERGAN-DM) 6 25-15 mg/5 mL oral syrup         Patient Instructions     1  Over-the-counter cetirizine 10 mg daily  2  Operate a vaporizer or humidifier in your sleeping area  3  Promethazine DM as prescribed for cough  4  Return here see her primary care provider in 7-10 days for any persistent symptoms  5  Go to the ER immediately for any significantly worsening symptoms  Chief Complaint     Chief Complaint   Patient presents with   • Cough     2 weeks           History of Present Illness       17-year-old female patient with a 2 week history of persistent waxing and waning semi productive cough which is worse at night  Patient denies absolutely any other symptoms:  No fever, chills, body aches, nasal congestion, runny nose  She did a COVID test today which was negative  She has been taking plain Mucinex over-the-counter without relief  Review of Systems   Review of Systems   Constitutional: Negative for chills and fever  HENT: Negative for ear pain and sore throat  Eyes: Negative for pain and visual disturbance  Respiratory: Positive for cough  Negative for shortness of breath  Cardiovascular: Negative for chest pain and palpitations  Gastrointestinal: Negative for abdominal pain and vomiting  Genitourinary: Negative for dysuria and hematuria  Musculoskeletal: Negative for arthralgias and back pain  Skin: Negative for color change and rash  Neurological: Negative for seizures and syncope  All other systems reviewed and are negative          Current Medications       Current Outpatient Medications:   •  Promethazine-DM (PHENERGAN-DM) 6 25-15 mg/5 mL oral syrup, Take 5 mL by mouth 4 (four) times a day as needed for cough, Disp: 120 mL, Rfl: 0  •  acetaminophen-codeine (TYLENOL with CODEINE #3) 300-30 MG per tablet, , Disp: , Rfl:   •  amoxicillin (AMOXIL) 500 mg capsule, , Disp: , Rfl:   •  cyclobenzaprine (FLEXERIL) 10 mg tablet, Take 1 tablet (10 mg total) by mouth daily at bedtime for 10 days, Disp: 10 tablet, Rfl: 0  •  predniSONE 10 mg tablet, Take 5 tabs day 1, 2; take 4 tabs day 3 ,4; take 3 tabs day 5, 6; take 2 tabs day 8, 9; take 1 tab day 10, 11 , Disp: 26 tablet, Rfl: 0  •  predniSONE 20 mg tablet, Take 20 mg by mouth  1 tablet daily for three days then 1 tablet daily for two days, Disp: , Rfl:   •  SUMAtriptan (IMITREX) 25 mg tablet, as needed   (Patient not taking: Reported on 11/14/2022), Disp: , Rfl:     Current Allergies     Allergies as of 11/14/2022   • (No Known Allergies)            The following portions of the patient's history were reviewed and updated as appropriate: allergies, current medications, past family history, past medical history, past social history, past surgical history and problem list      Past Medical History:   Diagnosis Date   • Asthma    • Migraines        Past Surgical History:   Procedure Laterality Date   • WISDOM TOOTH EXTRACTION         History reviewed  No pertinent family history  Medications have been verified  Objective   Pulse 74   Temp 98 °F (36 7 °C) (Temporal)   Resp 18   Wt 101 kg (222 lb 6 4 oz)   SpO2 100%   BMI 39 40 kg/m²        Physical Exam     Physical Exam  Constitutional:       Appearance: Normal appearance  HENT:      Head: Normocephalic  Right Ear: Tympanic membrane normal       Left Ear: Tympanic membrane normal       Nose: Nose normal       Mouth/Throat:      Mouth: Mucous membranes are moist       Pharynx: Oropharynx is clear  No oropharyngeal exudate or posterior oropharyngeal erythema     Eyes:      Conjunctiva/sclera: Conjunctivae normal       Pupils: Pupils are equal, round, and reactive to light  Cardiovascular:      Rate and Rhythm: Normal rate  Pulses: Normal pulses  Pulmonary:      Effort: Pulmonary effort is normal    Abdominal:      Tenderness: There is no abdominal tenderness  Musculoskeletal:         General: Normal range of motion  Cervical back: Normal range of motion  Skin:     General: Skin is warm and dry  Capillary Refill: Capillary refill takes less than 2 seconds  Neurological:      General: No focal deficit present  Mental Status: She is alert and oriented to person, place, and time     Psychiatric:         Mood and Affect: Mood normal          Behavior: Behavior normal

## 2023-06-20 ENCOUNTER — ANNUAL EXAM (OUTPATIENT)
Dept: OBGYN CLINIC | Facility: CLINIC | Age: 23
End: 2023-06-20
Payer: COMMERCIAL

## 2023-06-20 VITALS
SYSTOLIC BLOOD PRESSURE: 120 MMHG | DIASTOLIC BLOOD PRESSURE: 80 MMHG | HEIGHT: 66 IN | BODY MASS INDEX: 35.2 KG/M2 | WEIGHT: 219 LBS

## 2023-06-20 DIAGNOSIS — Z01.419 ENCOUNTER FOR GYNECOLOGICAL EXAMINATION WITHOUT ABNORMAL FINDING: Primary | ICD-10-CM

## 2023-06-20 DIAGNOSIS — Z11.3 SCREENING EXAMINATION FOR STD (SEXUALLY TRANSMITTED DISEASE): ICD-10-CM

## 2023-06-20 PROCEDURE — 87591 N.GONORRHOEAE DNA AMP PROB: CPT | Performed by: OBSTETRICS & GYNECOLOGY

## 2023-06-20 PROCEDURE — 87491 CHLMYD TRACH DNA AMP PROBE: CPT | Performed by: OBSTETRICS & GYNECOLOGY

## 2023-06-20 PROCEDURE — 99395 PREV VISIT EST AGE 18-39: CPT | Performed by: OBSTETRICS & GYNECOLOGY

## 2023-06-20 NOTE — PROGRESS NOTES
Assessment/Plan:         There are no diagnoses linked to this encounter  Subjective:      Patient ID: Tonja Powers is a 23 y o  female  Patient is a 24-year-old nulligravida who has had an IUD for over a year  Approximately 1 year ago she developed pelvic pain and went to the emergency room was found to have a 6 cm simple cyst   Repeat ultrasound in 8 to 12 weeks revealed that the cyst had resolved spontaneously  The IUD was placed properly  She continues to have some intermittent pelvic pain which she is able to track to coincide with ovulation  Her periods are regular but very very light most likely from the [place] [address] in place  We discussed the normal physiologic changes of the menstrual cycle and that she can expect to be aware of ovulation as the IUD does not suppress ovulation and the way that some birth control pills might  She would like to continue with the IUD as the symptoms are tolerable particularly now that she has some insight into the cause of the monthly discomfort  She is graduated with an associates degree and is starting [employer] in the fall  She is planning on studying biology and possibly to pursue a premedical course  Return in 1 year or as needed  The following portions of the patient's history were reviewed and updated as appropriate: allergies, current medications, past family history, past medical history, past social history, past surgical history and problem list     Review of Systems   Constitutional: Negative for chills, diaphoresis, fatigue, fever and unexpected weight change  HENT: Negative for congestion, sinus pressure, sinus pain, tinnitus and trouble swallowing  Eyes: Negative for visual disturbance  Respiratory: Negative for cough, chest tightness and shortness of breath  Cardiovascular: Negative for chest pain, palpitations and leg swelling     Gastrointestinal: Negative for abdominal distention, abdominal pain, anal bleeding, constipation, "diarrhea, nausea, rectal pain and vomiting  Endocrine: Negative for heat intolerance  Genitourinary: Negative for difficulty urinating, dysuria, flank pain, frequency, genital sores, hematuria and urgency  Musculoskeletal: Negative for arthralgias, back pain and joint swelling  Skin: Negative for rash  Allergic/Immunologic: Negative for environmental allergies and food allergies  Neurological: Negative for headaches  Hematological: Negative for adenopathy  Does not bruise/bleed easily  Psychiatric/Behavioral: Negative for decreased concentration and dysphoric mood  The patient is not nervous/anxious  Objective:      /80 (BP Location: Left arm, Patient Position: Sitting, Cuff Size: Large)   Ht 5' 6\" (1 676 m)   Wt 99 3 kg (219 lb)   BMI 35 35 kg/m²          Physical Exam  Vitals and nursing note reviewed  Exam conducted with a chaperone present  Constitutional:       General: She is not in acute distress  Appearance: Normal appearance  She is normal weight  She is not ill-appearing  HENT:      Head: Normocephalic  Nose: Nose normal       Mouth/Throat:      Mouth: Mucous membranes are moist       Pharynx: Oropharynx is clear  Eyes:      Conjunctiva/sclera: Conjunctivae normal       Pupils: Pupils are equal, round, and reactive to light  Cardiovascular:      Rate and Rhythm: Normal rate and regular rhythm  Pulses: Normal pulses  Pulmonary:      Effort: Pulmonary effort is normal       Breath sounds: Normal breath sounds  Chest:   Breasts: Osman Score is 5  Right: Normal  No mass, nipple discharge, skin change or tenderness  Left: Normal  No mass, nipple discharge, skin change or tenderness  Abdominal:      General: Abdomen is flat  Bowel sounds are normal       Palpations: Abdomen is soft  Genitourinary:     General: Normal vulva  Exam position: Lithotomy position        Osman stage (genital): 5       Vagina: Normal       Cervix: " Normal       Uterus: Normal        Adnexa: Right adnexa normal and left adnexa normal       Rectum: Normal       Comments: Strings visible  Musculoskeletal:         General: Normal range of motion  Cervical back: Neck supple  Lymphadenopathy:      Upper Body:      Right upper body: No axillary adenopathy  Left upper body: No axillary adenopathy  Skin:     General: Skin is warm and dry  Neurological:      General: No focal deficit present  Mental Status: She is alert     Psychiatric:         Mood and Affect: Mood normal

## 2023-06-22 LAB
C TRACH DNA SPEC QL NAA+PROBE: NEGATIVE
N GONORRHOEA DNA SPEC QL NAA+PROBE: NEGATIVE

## 2024-06-19 ENCOUNTER — HOSPITAL ENCOUNTER (OUTPATIENT)
Dept: RADIOLOGY | Age: 24
Discharge: HOME | End: 2024-06-19
Payer: COMMERCIAL

## 2024-06-19 ENCOUNTER — HOSPITAL ENCOUNTER (OUTPATIENT)
Dept: NON INVASIVE DIAGNOSTICS | Facility: HOSPITAL | Age: 24
Discharge: HOME | End: 2024-06-19
Payer: COMMERCIAL

## 2024-06-19 DIAGNOSIS — I10 ESSENTIAL (PRIMARY) HYPERTENSION: ICD-10-CM

## 2024-06-19 PROCEDURE — 93975 VASCULAR STUDY: CPT

## 2024-06-19 PROCEDURE — 93975 VASCULAR STUDY: CPT | Performed by: SURGERY

## 2024-06-19 PROCEDURE — 76775 US EXAM ABDO BACK WALL LIM: CPT

## 2024-06-21 ENCOUNTER — HOSPITAL ENCOUNTER (OUTPATIENT)
Dept: NON INVASIVE DIAGNOSTICS | Facility: CLINIC | Age: 24
Discharge: HOME | End: 2024-06-21
Payer: COMMERCIAL

## 2024-06-21 VITALS
SYSTOLIC BLOOD PRESSURE: 120 MMHG | DIASTOLIC BLOOD PRESSURE: 80 MMHG | BODY MASS INDEX: 35.35 KG/M2 | HEART RATE: 74 BPM | HEIGHT: 66 IN

## 2024-06-21 DIAGNOSIS — I10 ESSENTIAL (PRIMARY) HYPERTENSION: ICD-10-CM

## 2024-06-21 LAB
AORTIC ROOT: 2.8 CM
APICAL FOUR CHAMBER EJECTION FRACTION: 57 %
ASCENDING AORTA: 2.4 CM
E WAVE DECELERATION TIME: 238 MS
E/A RATIO: 1.7
FRACTIONAL SHORTENING: 40 (ref 28–44)
INTERVENTRICULAR SEPTUM IN DIASTOLE (PARASTERNAL SHORT AXIS VIEW): 0.9 CM
INTERVENTRICULAR SEPTUM: 0.9 CM (ref 0.6–1.1)
IVC: 12 MM
LAAS-AP2: 17.5 CM2
LAAS-AP4: 14.5 CM2
LEFT ATRIUM SIZE: 4.3 CM
LEFT ATRIUM VOLUME (MOD BIPLANE): 39 ML
LEFT ATRIUM VOLUME INDEX (MOD BIPLANE): 18.8 ML/M2
LEFT INTERNAL DIMENSION IN SYSTOLE: 2.9 CM (ref 2.1–4)
LEFT VENTRICULAR INTERNAL DIMENSION IN DIASTOLE: 4.8 CM (ref 3.5–6)
LEFT VENTRICULAR POSTERIOR WALL IN END DIASTOLE: 1.1 CM
LEFT VENTRICULAR STROKE VOLUME: 76 ML
LVSV (TEICH): 76 ML
MV E'TISSUE VEL-SEP: 15 CM/S
MV PEAK A VEL: 0.37 M/S
MV PEAK E VEL: 63 CM/S
MV STENOSIS PRESSURE HALF TIME: 69 MS
MV VALVE AREA P 1/2 METHOD: 3.2
RA PRESSURE ESTIMATED: 3 MMHG
RIGHT ATRIUM AREA SYSTOLE A4C: 12.7 CM2
RIGHT VENTRICLE ID DIMENSION: 3.5 CM
SL CV LEFT ATRIUM LENGTH A2C: 6 CM
SL CV LV EF: 60
SL CV PED ECHO LEFT VENTRICLE DIASTOLIC VOLUME (MOD BIPLANE) 2D: 108 ML
SL CV PED ECHO LEFT VENTRICLE SYSTOLIC VOLUME (MOD BIPLANE) 2D: 31 ML
TRICUSPID ANNULAR PLANE SYSTOLIC EXCURSION: 2.4 CM

## 2024-06-21 PROCEDURE — 93306 TTE W/DOPPLER COMPLETE: CPT | Performed by: INTERNAL MEDICINE

## 2024-06-21 PROCEDURE — 93306 TTE W/DOPPLER COMPLETE: CPT

## 2024-06-25 ENCOUNTER — ANNUAL EXAM (OUTPATIENT)
Dept: OBGYN CLINIC | Facility: CLINIC | Age: 24
End: 2024-06-25
Payer: COMMERCIAL

## 2024-06-25 VITALS
HEIGHT: 66 IN | BODY MASS INDEX: 35.29 KG/M2 | DIASTOLIC BLOOD PRESSURE: 68 MMHG | WEIGHT: 219.6 LBS | SYSTOLIC BLOOD PRESSURE: 108 MMHG

## 2024-06-25 DIAGNOSIS — Z01.419 ENCOUNTER FOR GYNECOLOGICAL EXAMINATION WITHOUT ABNORMAL FINDING: Primary | ICD-10-CM

## 2024-06-25 DIAGNOSIS — Z12.4 SCREENING FOR MALIGNANT NEOPLASM OF THE CERVIX: ICD-10-CM

## 2024-06-25 PROCEDURE — 99395 PREV VISIT EST AGE 18-39: CPT | Performed by: OBSTETRICS & GYNECOLOGY

## 2024-06-25 PROCEDURE — G0145 SCR C/V CYTO,THINLAYER,RESCR: HCPCS | Performed by: OBSTETRICS & GYNECOLOGY

## 2024-06-25 RX ORDER — AMLODIPINE BESYLATE 2.5 MG/1
2.5 TABLET ORAL DAILY
COMMUNITY
Start: 2024-06-08

## 2024-06-25 RX ORDER — LISINOPRIL 10 MG/1
10 TABLET ORAL DAILY
COMMUNITY

## 2024-06-25 NOTE — PROGRESS NOTES
Ambulatory Visit  Name: Kenzie Persaud      : 2000      MRN: [id number]  Encounter Provider: Yumiko Quiroga MD  Encounter Date: 2024   Encounter department: [facility]    Assessment & Plan   1. Encounter for gynecological examination without abnormal finding      History of Present Illness     Kenzie Persaud is a 23 y.o. female who presents for annual exam.  She has near total amenorrhea on the IUD but does develop pelvic pain mostly in her left lower quadrant which might be cyclical in nature.  Not having any of the pain now and she has had a normal examination.  If the pain recurs we will order an ultrasound of the pelvis.  She does have a history of a cyst on her ovary which did resolve spontaneously.  She has been newly diagnosed with hypertension and is currently working with her internal medicine physician to find an appropriate medication.  He is entering her senior year in college and is interested in eventually studying medicine.  Will see her back in 1 year or as needed.    Review of Systems   Constitutional:  Negative for chills, diaphoresis, fatigue, fever and unexpected weight change.   HENT:  Negative for congestion, sinus pressure, sinus pain, tinnitus and trouble swallowing.    Eyes:  Negative for visual disturbance.   Respiratory:  Negative for cough, chest tightness and shortness of breath.    Cardiovascular:  Negative for chest pain, palpitations and leg swelling.   Gastrointestinal:  Negative for abdominal distention, abdominal pain, anal bleeding, constipation, diarrhea, nausea, rectal pain and vomiting.   Endocrine: Negative for heat intolerance.   Genitourinary:  Negative for difficulty urinating, dysuria, flank pain, frequency, genital sores, hematuria and urgency.   Musculoskeletal:  Negative for arthralgias, back pain and joint swelling.   Skin:  Negative for rash.   Allergic/Immunologic: Negative for environmental allergies and food allergies.   Neurological:   "Negative for headaches.   Hematological:  Negative for adenopathy. Does not bruise/bleed easily.   Psychiatric/Behavioral:  Negative for decreased concentration and dysphoric mood. The patient is not nervous/anxious.        Objective     /68 (BP Location: Left arm, Patient Position: Sitting, Cuff Size: Adult)   Ht 5' 6\" (1.676 m)   Wt 99.6 kg (219 lb 9.6 oz)   BMI 35.44 kg/m²     Physical Exam  Vitals reviewed. Exam conducted with a chaperone present.   Constitutional:       Appearance: Normal appearance.   HENT:      Mouth/Throat:      Mouth: Mucous membranes are moist.      Pharynx: Oropharynx is clear.   Eyes:      Conjunctiva/sclera: Conjunctivae normal.      Pupils: Pupils are equal, round, and reactive to light.   Cardiovascular:      Rate and Rhythm: Normal rate and regular rhythm.      Pulses: Normal pulses.      Heart sounds: Normal heart sounds.   Pulmonary:      Effort: Pulmonary effort is normal.      Breath sounds: Normal breath sounds.   Abdominal:      General: Bowel sounds are normal.      Palpations: Abdomen is soft.   Genitourinary:     General: Normal vulva.      Exam position: Lithotomy position.      Osman stage (genital): 5.      Vagina: Normal.      Cervix: Normal.      Uterus: Normal.       Adnexa: Right adnexa normal and left adnexa normal.      Rectum: Normal.   Musculoskeletal:         General: Normal range of motion.      Cervical back: Neck supple.   Skin:     General: Skin is warm and dry.   Neurological:      General: No focal deficit present.      Mental Status: She is alert and oriented to person, place, and time.   Psychiatric:         Mood and Affect: Mood normal.       Administrative Statements           "

## 2024-06-28 LAB
LAB AP GYN PRIMARY INTERPRETATION: NORMAL
Lab: NORMAL

## 2024-08-14 ENCOUNTER — APPOINTMENT (OUTPATIENT)
Dept: LAB | Facility: HOSPITAL | Age: 24
End: 2024-08-14

## 2024-08-14 DIAGNOSIS — Z11.1 TUBERCULOSIS SCREENING: ICD-10-CM

## 2024-08-14 PROCEDURE — 36415 COLL VENOUS BLD VENIPUNCTURE: CPT

## 2024-08-14 PROCEDURE — 86480 TB TEST CELL IMMUN MEASURE: CPT

## 2024-08-15 LAB
GAMMA INTERFERON BACKGROUND BLD IA-ACNC: 0.01 IU/ML
M TB IFN-G BLD-IMP: NEGATIVE
M TB IFN-G CD4+ BCKGRND COR BLD-ACNC: 0.02 IU/ML
M TB IFN-G CD4+ BCKGRND COR BLD-ACNC: 0.03 IU/ML
MITOGEN IGNF BCKGRD COR BLD-ACNC: 9.99 IU/ML

## 2025-05-01 ENCOUNTER — TELEPHONE (OUTPATIENT)
Age: 25
End: 2025-05-01

## 2025-05-01 NOTE — TELEPHONE ENCOUNTER
Pt confirmed first time neuro appt request for June 2nd at 8:30 am in [place] with Mago Olivia. Pt confirmed neck pain as primary dx that causes occasional headaches.

## 2025-05-05 NOTE — TELEPHONE ENCOUNTER
Patient called to reschedule 6/2 at 830am NP appt with [facility] because she can not make it. Accepted 6/11 at 1pm. Address was provided.

## 2025-05-28 ENCOUNTER — TELEPHONE (OUTPATIENT)
Age: 25
End: 2025-05-28

## 2025-05-28 NOTE — TELEPHONE ENCOUNTER
----- Message from JAHAIRA Akbar sent at 5/27/2025  8:47 PM EDT -----  Regarding: NP in June  Can we take a look at this and find out why I am seeing her. She was scheduled with [facility] but pt had to reschedule.  Now she is coming to [place] with me.I am concerned as it is labeled with neck pain and if there is consideration for cervical dystonia and botox to the neck, then Mago would be that person.  There is no information on the chart for this review that I can find. So if you can check into it and see what the pt is in need or or looking for.[place]

## 2025-05-28 NOTE — TELEPHONE ENCOUNTER
Pt returning Arabella's call. Per Teams communication w/Arabella, she is w/patients and unable to take the call at this time.     [phone] - cb# pt will be available to answer at any time for the rest of the day      Arabella bui

## 2025-05-28 NOTE — TELEPHONE ENCOUNTER
I left a detailed message asking that the Patient call me to discuss further details on the pts upcoming appointment.

## 2025-05-29 NOTE — TELEPHONE ENCOUNTER
Inbound call received from Patient returning call and Patient was warm transferred to Arabella Lyn MA for further assistance.

## 2025-05-29 NOTE — TELEPHONE ENCOUNTER
I did speak to Patient.   She stated that her main concern is left sided neck pain for several years. She believes the HA and Migraines are associated with that.   She did do PHYSICAL THERAPY briefly 5 years ago and did see a chiropractor 5 + years ago with no relief.  Pts Mom had asked he PM  who suggested Patient see neuro.  I did inform Patient that I would discuss the case with you and to see if Patient should see an attending or tp continue with the scheduled appointment.

## 2025-06-11 ENCOUNTER — TELEPHONE (OUTPATIENT)
Age: 25
End: 2025-06-11

## 2025-06-11 ENCOUNTER — OFFICE VISIT (OUTPATIENT)
Age: 25
End: 2025-06-11
Payer: COMMERCIAL

## 2025-06-11 VITALS
SYSTOLIC BLOOD PRESSURE: 102 MMHG | WEIGHT: 235 LBS | HEART RATE: 82 BPM | BODY MASS INDEX: 37.77 KG/M2 | HEIGHT: 66 IN | DIASTOLIC BLOOD PRESSURE: 80 MMHG

## 2025-06-11 DIAGNOSIS — M54.2 CERVICALGIA: Primary | ICD-10-CM

## 2025-06-11 DIAGNOSIS — G43.109 MIGRAINE WITH AURA AND WITHOUT STATUS MIGRAINOSUS, NOT INTRACTABLE: ICD-10-CM

## 2025-06-11 DIAGNOSIS — M54.2 CERVICALGIA: ICD-10-CM

## 2025-06-11 DIAGNOSIS — G44.86 CERVICOGENIC HEADACHE: ICD-10-CM

## 2025-06-11 PROCEDURE — 99204 OFFICE O/P NEW MOD 45 MIN: CPT | Performed by: NURSE PRACTITIONER

## 2025-06-11 RX ORDER — BUTALBITAL, ACETAMINOPHEN AND CAFFEINE 50; 325; 40 MG/1; MG/1; MG/1
1 TABLET ORAL ONCE AS NEEDED
Qty: 12 TABLET | Refills: 0 | Status: SHIPPED | OUTPATIENT
Start: 2025-06-11

## 2025-06-11 RX ORDER — TIZANIDINE 2 MG/1
2 TABLET ORAL
Qty: 30 TABLET | Refills: 4 | Status: SHIPPED | OUTPATIENT
Start: 2025-06-11

## 2025-06-11 RX ORDER — BUTALBITAL, ACETAMINOPHEN AND CAFFEINE 50; 325; 40 MG/1; MG/1; MG/1
1 TABLET ORAL ONCE AS NEEDED
Qty: 12 TABLET | Refills: 0 | Status: SHIPPED | OUTPATIENT
Start: 2025-06-11 | End: 2025-06-11 | Stop reason: SDUPTHER

## 2025-06-11 RX ORDER — ESCITALOPRAM OXALATE 5 MG/1
5 TABLET ORAL DAILY
COMMUNITY

## 2025-06-11 NOTE — TELEPHONE ENCOUNTER
[facility] pharmacist call to clarification directions for Butalbital-acetaminophen-caffeine (Esgic) -40 mg per tablet.      [place],  Pharmacist ask to please provide the maximum daily dose.    Thank you!

## 2025-06-11 NOTE — ASSESSMENT & PLAN NOTE
Orders:    XR spine cervical complete 6+ vw flex/ext/obl; Future    Ambulatory Referral to Physical Therapy; Future    tiZANidine (ZANAFLEX) 2 mg tablet; Take 1 tablet (2 mg total) by mouth daily at bedtime as needed for muscle spasms    butalbital-acetaminophen-caffeine (Esgic) -40 mg per tablet; Take 1 tablet by mouth once as needed for headaches

## 2025-06-11 NOTE — PROGRESS NOTES
Name: Kenzie Persaud      : 2000      MRN: [id number]  Encounter Provider: JAHAIRA Sesay  Encounter Date: 2025   [facility]  :  Assessment & Plan  Cervicalgia    Orders:    XR spine cervical complete 6+ vw flex/ext/obl; Future    Ambulatory Referral to Physical Therapy; Future    tiZANidine (ZANAFLEX) 2 mg tablet; Take 1 tablet (2 mg total) by mouth daily at bedtime as needed for muscle spasms    butalbital-acetaminophen-caffeine (Esgic) -40 mg per tablet; Take 1 tablet by mouth once as needed for headaches    Cervicogenic headache    Orders:    XR spine cervical complete 6+ vw flex/ext/obl; Future    Ambulatory Referral to Physical Therapy; Future    tiZANidine (ZANAFLEX) 2 mg tablet; Take 1 tablet (2 mg total) by mouth daily at bedtime as needed for muscle spasms    butalbital-acetaminophen-caffeine (Esgic) -40 mg per tablet; Take 1 tablet by mouth once as needed for headaches    Migraine with aura and without status migrainosus, not intractable           Patient Instructions   Start on Tizanidine 2mg daily at bedtime as needed for neck tightness and tension/headache  Can use Fioricet for headache if not responding to NSAIDs  Will get Cervical Xray series for torticollis, neck pain and structural abnormality  Will restart in PT for modalities re: neck pain and neck related headaches  Can trial magnesium 400mg daily at bedtime  Can trial Riboflavin 400mg daily  Continue to hydrate well   Follow up with Neurology office in 4 months or sooner if needed.      History of Present Illness   Kenzie Persaud is a 23yo female with PMH of asthma and migraines who is being referred to the outpatient neurology office for back pain, sciatica and neck pain.  Patient initially scheduled with migraine subspecialty in [place] however had to reschedule due to being unable to make the appointment.  She was then rescheduled in  [place] for today.  Patient indicated she is primarily concerned regarding left-sided neck pain for several years.  She believes headaches and migraines are associated with that.  Patient did physical therapy briefly 5 years ago and did see a chiropractor 5+ years ago with no relief.  Patient's mother had asked pain management provider about who should be seeing the patient, they recommended neurology.  Reviewed of diagnostics, patient apparently had x-ray in 2013 however no report or films were available for review.    Patient reports to the neurology office today, she does have long-term left neck pain for many years.  She had previously gone to physical therapy for a couple of weeks was given home exercises to do.  She did not feel as though she had any benefits from these exercises at that time.  She also had chiropractic care during that time and noted at times she would feel better but then at times she would also feel worse.  Patient was told it is likely related to torticollis and she does try to change the way she sleeps.  She sleeps on the left side with a pillow that supports her head and neck so she is extended.  Patient states that she rarely sleeps to the right side.  Patient denied having any neck or head trauma as a child prior to the onset of her symptoms.  She has not had any injuries causing malalignment or pain.  She does not recall any diagnostics previously done and has never been given muscle relaxants, trigger point injections or steroids.  Patient denies having any significant weakness in the upper extremities, she denies any radicular symptoms down into the left arm including pain and or paresthesia.  She has full range of motion with some tension and tightness when she has extension and flexion to the right, she can feel the tongue and is in the neck trap and shoulder.  Patient reports that when she has neck pain, it will then develop into a headache.  She states that headaches are  generally starting from the left neck, go up the left side of the head to the frontal area into her forehead.  She states the pain is like a tension/bandlike pain that lasts for hours or until she ices and goes to sleep.  She gets these approximately 2 times per week and is usually towards the end of the day, especially if she has neck pain through the day.  She will take Advil or Tylenol alternating back-and-forth and notes the Advil does tend to work a little bit better but does not fully take the headache and/or pain away.  She has no associated symptoms with these headaches.  Patient cannot really isolate any sort of trigger to these headaches however notes that she does have some increased stressors but notes since being out of school she has had less stress and feels as though her headache activity and neck pain have increased.  Patient does endorse having a history of migraine, they are usually around her menstrual cycle.  She will get 1 day of migraine headache at the onset of her menses.  She feels these are manageable and are not of concern to her, she is more concerned with regards to the neck pain and the cervicogenic headaches related to them.  Patient states she has an IUD but is usually pretty regular with menstrual symptoms therefore should be able to premedicate 1 to 2 days prior to the onset of her menstrual cycle to help reduce her incidence of migraine headache.  On neurological exam, patient has no focal deficits.  On inspection patient does have mild tilt to the left of her head and neck, has some tension to right sided flexion and extension.  Movements and range of motion testing did not elicit any radicular type symptoms including pain and/or paresthesia.  We talked about treatment options, she has not been to physical therapy for approximately 5 years.  Question if additional modalities may be of benefit through neurophysiological therapy.  Possibly dry needling versus e-stim versus other  alternatives and recommended patient return to physical therapy center that may have neuro therapist available for her to work with.  Patient will look into the PT center in [facility].    Patient has not had any diagnostic studies, if structural etiology, will get cervical spine x-rays for now.  If she continues to have pain after physical therapy and conservative measures, will obtain MRI of the cervical spine.  Will give tizanidine 2 mg daily at bedtime, she is to update the neurology office for any side effects or if she does not feel as though it is effective enough to help reduce the incidence of neck tightness and pain.  Patient aware this can cause grogginess, encouraged to take this at bedtime.  Patient may benefit from afternoon dosing if she is able to tolerate it.  She will update the neurology office if needed.  Patient can trial magnesium and riboflavin for headache activity, muscle tightness tension and spasming.  Will trial Fioricet if NSAIDs are ineffective for her headache activity and neck pain.  This may be of benefit for abortive option.  Patient is aware that Fioricet can work for both migraine and headache.    Patient to follow-up in the outpatient neurology office in 4 months or sooner if needed.         Review of Systems   Constitutional:  Negative for chills and fever.   HENT:  Negative for ear pain and sore throat.    Eyes:  Negative for pain and visual disturbance.   Respiratory:  Negative for cough and shortness of breath.    Cardiovascular:  Negative for chest pain and palpitations.   Gastrointestinal:  Negative for abdominal pain and vomiting.   Genitourinary:  Negative for dysuria and hematuria.   Musculoskeletal:  Positive for neck pain and neck stiffness. Negative for arthralgias and back pain.   Skin:  Negative for color change and rash.   Neurological:  Positive for headaches. Negative for seizures and syncope.   All other systems reviewed and are negative.   I have  "personally reviewed the MA's review of systems and made changes as necessary.        Past Medical History[1]    Past Surgical History[2]    Social History[3]    Family History[4]    Current Medications[5]    No Known Allergies     Blood pressure 102/80, pulse 82, height 5' 6\" (1.676 m), weight 107 kg (235 lb).        Objective   /80 (BP Location: Left arm, Patient Position: Sitting, Cuff Size: Large)   Pulse 82   Ht 5' 6\" (1.676 m)   Wt 107 kg (235 lb)   BMI 37.93 kg/m²     Physical Exam  Vitals reviewed.   Constitutional:       Appearance: Normal appearance. She is well-developed.   HENT:      Head: Normocephalic.      Nose: Nose normal.      Mouth/Throat:      Mouth: Mucous membranes are moist.     Eyes:      General: Lids are normal.      Extraocular Movements: Extraocular movements intact.      Pupils: Pupils are equal, round, and reactive to light.     Neck:      Comments: + stretching tension to Rtward flexion and extensionPulmonary:      Effort: Pulmonary effort is normal.   Abdominal:      Palpations: Abdomen is soft.     Musculoskeletal:      Cervical back: Normal range of motion.     Skin:     General: Skin is warm and dry.     Neurological:      Mental Status: She is alert.      Motor: Motor strength is normal.     Coordination: Romberg sign negative.      Deep Tendon Reflexes: Reflexes are normal and symmetric.     Psychiatric:         Attention and Perception: Attention and perception normal.         Mood and Affect: Mood and affect normal.         Speech: Speech normal.         Behavior: Behavior normal. Behavior is cooperative.         Thought Content: Thought content normal.         Cognition and Memory: Cognition and memory normal.         Judgment: Judgment normal.       Neurological Exam  Mental Status  Alert. Oriented to person, place, time and situation. Memory is normal. Recent and remote memory are intact. Speech is normal. Language is fluent with no aphasia. Attention and " concentration are normal. Fund of knowledge is appropriate for level of education.    Cranial Nerves  CN II: Visual acuity is normal. Visual fields full to confrontation.  CN III, IV, VI: Extraocular movements intact bilaterally. Normal lids and orbits bilaterally. Pupils equal round and reactive to light bilaterally.  CN V: Facial sensation is normal.  CN VII: Full and symmetric facial movement.  CN VIII: Hearing is normal.  CN IX, X: Palate elevates symmetrically. Normal gag reflex.  CN XI: Shoulder shrug strength is normal.  CN XII: Tongue midline without atrophy or fasciculations.    Motor  Normal muscle bulk throughout. Normal muscle tone. No abnormal involuntary movements. Strength is 5/5 throughout all four extremities.    Sensory  Light touch is normal in upper and lower extremities. Temperature is normal in upper and lower extremities. Vibration is normal in upper and lower extremities. Proprioception is normal in upper and lower extremities.     Reflexes  Deep tendon reflexes are 2+ and symmetric in all four extremities.    Right pathological reflexes: Patricia's absent.  Left pathological reflexes: Patricia's absent.    Coordination  Right: Finger-to-nose normal. Rapid alternating movement normal. Heel-to-shin normal.Left: Finger-to-nose normal. Rapid alternating movement normal. Heel-to-shin normal.    Gait  Casual gait is normal including stance, stride, and arm swing.Normal toe walking. Normal heel walking. Normal tandem gait. Romberg is absent. Able to rise from chair without using arms.      Radiology Results Review : No pertinent imaging studies reviewed.    Administrative Statements   I have spent a total time of 40 minutes in caring for this patient on the day of the visit/encounter including Risks and benefits of tx options, Instructions for management, Patient and family education, Counseling / Coordination of care, Documenting in the medical record, Reviewing/placing orders in the medical  record (including tests, medications, and/or procedures), and Obtaining or reviewing history  .         [1]   Past Medical History:  Diagnosis Date    Asthma     Migraines    [2]   Past Surgical History:  Procedure Laterality Date    WISDOM TOOTH EXTRACTION     [3]   Social History  Socioeconomic History    Marital status: Single   Tobacco Use    Smoking status: Never    Smokeless tobacco: Never   Vaping Use    Vaping status: Never Used   Substance and Sexual Activity    Alcohol use: Yes     Comment: socially    Drug use: Never    Sexual activity: Yes     Partners: Male     Birth control/protection: Condom Male, I.U.D.   [4]   Family History  Problem Relation Name Age of Onset    Hypertension Mother Gerson Persaud     Thyroid disease Mother Gerson Persaud     Hypertension Father Terry Persaud     Hypertension Maternal Grandfather Jacek Montgomery     Hypertension Maternal Grandmother Aurea Melchor     Thyroid disease Maternal Grandmother Aurea Melchor    [5]   Current Outpatient Medications:     amLODIPine (NORVASC) 2.5 mg tablet, Take 2.5 mg by mouth in the morning. (Patient taking differently: Take 5 mg by mouth in the morning.), Disp: , Rfl:     butalbital-acetaminophen-caffeine (Esgic) -40 mg per tablet, Take 1 tablet by mouth once as needed for headaches, Disp: 12 tablet, Rfl: 0    escitalopram (LEXAPRO) 5 mg tablet, Take 5 mg by mouth daily, Disp: , Rfl:     lisinopril (ZESTRIL) 10 mg tablet, Take 10 mg by mouth in the morning. (Patient taking differently: Take 5 mg by mouth in the morning.), Disp: , Rfl:     tiZANidine (ZANAFLEX) 2 mg tablet, Take 1 tablet (2 mg total) by mouth daily at bedtime as needed for muscle spasms, Disp: 30 tablet, Rfl: 4    SUMAtriptan (IMITREX) 25 mg tablet, as needed   (Patient not taking: Reported on 6/11/2025), Disp: , Rfl:

## 2025-06-11 NOTE — PATIENT INSTRUCTIONS
Start on Tizanidine 2mg daily at bedtime as needed for neck tightness and tension/headache  Can use Fioricet for headache if not responding to NSAIDs  Will get Cervical Xray series for torticollis, neck pain and structural abnormality  Will restart in PT for modalities re: neck pain and neck related headaches  Can trial magnesium 400mg daily at bedtime  Can trial Riboflavin 400mg daily  Continue to hydrate well   Follow up with Neurology office in 4 months or sooner if needed.

## 2025-07-01 ENCOUNTER — ANNUAL EXAM (OUTPATIENT)
Dept: OBGYN CLINIC | Facility: CLINIC | Age: 25
End: 2025-07-01
Payer: COMMERCIAL

## 2025-07-01 VITALS
DIASTOLIC BLOOD PRESSURE: 88 MMHG | WEIGHT: 237 LBS | HEIGHT: 66 IN | SYSTOLIC BLOOD PRESSURE: 128 MMHG | BODY MASS INDEX: 38.09 KG/M2

## 2025-07-01 DIAGNOSIS — Z01.419 ENCOUNTER FOR GYNECOLOGICAL EXAMINATION WITHOUT ABNORMAL FINDING: Primary | ICD-10-CM

## 2025-07-01 PROCEDURE — G0145 SCR C/V CYTO,THINLAYER,RESCR: HCPCS | Performed by: OBSTETRICS & GYNECOLOGY

## 2025-07-01 PROCEDURE — S0612 ANNUAL GYNECOLOGICAL EXAMINA: HCPCS | Performed by: OBSTETRICS & GYNECOLOGY

## 2025-07-01 RX ORDER — LOSARTAN POTASSIUM 25 MG/1
25 TABLET ORAL DAILY
COMMUNITY
Start: 2025-06-26

## 2025-07-01 NOTE — PROGRESS NOTES
Name: Kenzie Persaud      : 2000      MRN: [id number]  Encounter Provider: Yumiko Quiroga MD  Encounter Date: 2025   Encounter department: [facility] [place] [facility]  :  Assessment & Plan  Encounter for gynecological examination without abnormal finding             History of Present Illness   HPI  Kenzie Persaud is a 24 y.o. female who presents Kenzie Persaud is a 23 y.o. female who presents for annual exam.  She has near total amenorrhea on the IUD but had developed pelvic pain mostly in her left lower quadrant which might be cyclical in nature.  It has not changed over the course of the last year.  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  Will see her back in 1 year or as needed.      The following portions of the patient's history were reviewed and updated as appropriate: allergies, current medications, past family history, past medical history, past social history, past surgical history and problem list.         Review of Systems   Constitutional:  Negative for chills, diaphoresis, fatigue, fever and unexpected weight change.   HENT:  Negative for congestion, sinus pressure, sinus pain, tinnitus and trouble swallowing.    Eyes:  Negative for visual disturbance.   Respiratory:  Negative for cough, chest tightness and shortness of breath.    Cardiovascular:  Negative for chest pain, palpitations and leg swelling.   Gastrointestinal:  Negative for abdominal distention, abdominal pain, anal bleeding, constipation, diarrhea, nausea, rectal pain and vomiting.   Endocrine: Negative for heat intolerance.   Genitourinary:  Negative for difficulty urinating, dysuria, flank pain, frequency, genital sores, hematuria and urgency.   Musculoskeletal:  Negative for arthralgias, back pain and joint swelling.   Skin:  Negative for rash.  "  Allergic/Immunologic: Negative for environmental allergies and food allergies.   Neurological:  Negative for headaches.   Hematological:  Negative for adenopathy. Does not bruise/bleed easily.   Psychiatric/Behavioral:  Negative for decreased concentration and dysphoric mood. The patient is not nervous/anxious.           Objective   /88 (BP Location: Left arm, Patient Position: Sitting, Cuff Size: Adult)   Ht 5' 6\" (1.676 m)   Wt 108 kg (237 lb)   LMP  (LMP Unknown)   BMI 38.25 kg/m²      Physical Exam  Vitals reviewed. Exam conducted with a chaperone present.   Constitutional:       Appearance: Normal appearance.   HENT:      Mouth/Throat:      Mouth: Mucous membranes are moist.      Pharynx: Oropharynx is clear.     Eyes:      Conjunctiva/sclera: Conjunctivae normal.      Pupils: Pupils are equal, round, and reactive to light.       Cardiovascular:      Rate and Rhythm: Normal rate and regular rhythm.      Pulses: Normal pulses.      Heart sounds: Normal heart sounds.   Pulmonary:      Effort: Pulmonary effort is normal.      Breath sounds: Normal breath sounds.   Chest:   Breasts:     Right: Normal.      Left: Normal.   Abdominal:      General: Bowel sounds are normal.      Palpations: Abdomen is soft.   Genitourinary:     General: Normal vulva.      Exam position: Lithotomy position.      Osman stage (genital): 5.      Labia:         Right: No lesion.         Left: No lesion.       Urethra: No urethral lesion.      Vagina: Normal.      Cervix: Normal.      Uterus: Normal.       Adnexa: Right adnexa normal and left adnexa normal.      Rectum: Normal.     Musculoskeletal:         General: Normal range of motion.      Cervical back: Neck supple.     Skin:     General: Skin is warm and dry.     Neurological:      General: No focal deficit present.      Mental Status: She is alert and oriented to person, place, and time.     Psychiatric:         Mood and Affect: Mood normal.           "

## 2025-07-07 LAB
LAB AP GYN PRIMARY INTERPRETATION: NORMAL
Lab: NORMAL

## 2025-07-25 ENCOUNTER — HOSPITAL ENCOUNTER (OUTPATIENT)
Dept: RADIOLOGY | Facility: HOSPITAL | Age: 25
End: 2025-07-25
Payer: COMMERCIAL

## 2025-07-25 DIAGNOSIS — G44.86 CERVICOGENIC HEADACHE: ICD-10-CM

## 2025-07-25 DIAGNOSIS — M54.2 CERVICALGIA: ICD-10-CM

## 2025-07-25 PROCEDURE — 72052 X-RAY EXAM NECK SPINE 6/>VWS: CPT

## 2025-08-01 ENCOUNTER — RESULTS FOLLOW-UP (OUTPATIENT)
Age: 25
End: 2025-08-01